# Patient Record
Sex: FEMALE | Race: WHITE | Employment: FULL TIME | URBAN - METROPOLITAN AREA
[De-identification: names, ages, dates, MRNs, and addresses within clinical notes are randomized per-mention and may not be internally consistent; named-entity substitution may affect disease eponyms.]

---

## 2020-01-29 PROBLEM — Z34.90 PREGNANCY: Status: ACTIVE | Noted: 2020-01-29

## 2020-02-04 PROBLEM — O34.219 HX SUCCESSFUL VBAC (VAGINAL BIRTH AFTER CESAREAN), CURRENTLY PREGNANT: Status: RESOLVED | Noted: 2020-02-04 | Resolved: 2020-02-04

## 2020-02-04 PROBLEM — Z34.90 TERM PREGNANCY: Status: ACTIVE | Noted: 2020-02-04

## 2020-02-04 PROBLEM — Z34.90 TERM PREGNANCY: Status: RESOLVED | Noted: 2020-02-04 | Resolved: 2020-02-04

## 2020-02-04 PROBLEM — O34.219 HX SUCCESSFUL VBAC (VAGINAL BIRTH AFTER CESAREAN), CURRENTLY PREGNANT: Status: ACTIVE | Noted: 2020-02-04

## 2020-02-04 PROBLEM — O34.219 VBAC, DELIVERED, CURRENT HOSPITALIZATION: Status: ACTIVE | Noted: 2020-02-04

## 2020-02-04 PROBLEM — Z34.90 PREGNANCY: Status: RESOLVED | Noted: 2020-01-29 | Resolved: 2020-02-04

## 2020-12-14 ENCOUNTER — VIRTUAL VISIT (OUTPATIENT)
Dept: FAMILY MEDICINE CLINIC | Age: 30
End: 2020-12-14
Payer: COMMERCIAL

## 2020-12-14 DIAGNOSIS — F41.9 ANXIETY: Primary | ICD-10-CM

## 2020-12-14 PROBLEM — O34.219 VBAC, DELIVERED, CURRENT HOSPITALIZATION: Status: RESOLVED | Noted: 2020-02-04 | Resolved: 2020-12-14

## 2020-12-14 PROCEDURE — 99203 OFFICE O/P NEW LOW 30 MIN: CPT | Performed by: INTERNAL MEDICINE

## 2020-12-14 RX ORDER — ESCITALOPRAM OXALATE 10 MG/1
10 TABLET ORAL DAILY
Qty: 30 TAB | Refills: 0 | Status: SHIPPED | OUTPATIENT
Start: 2020-12-14 | End: 2021-01-13 | Stop reason: ALTCHOICE

## 2020-12-14 RX ORDER — SERTRALINE HYDROCHLORIDE 50 MG/1
TABLET, FILM COATED ORAL
COMMUNITY
End: 2020-12-14

## 2020-12-14 RX ORDER — ACETAMINOPHEN AND CODEINE PHOSPHATE 120; 12 MG/5ML; MG/5ML
SOLUTION ORAL
COMMUNITY
Start: 2020-11-23 | End: 2021-01-13 | Stop reason: ALTCHOICE

## 2020-12-14 NOTE — PROGRESS NOTES
Ryan Barbosa is a 27 y.o. female who was seen by synchronous (real-time) audio-video technology using doxy. me on 2020. Location of the patient: Home    Location of the provider: [facility]    Consent:  She and/or health care decision maker is aware that that she may receive a bill for this telehealth service, depending on her insurance coverage, and has provided verbal consent to proceed: Yes    Subjective:   Ryan Barbosa is a 27 y.o. female who presents today for management of    Chief Complaint   Patient presents with   [facility].       Patient is here to establish care. Previous PCP: none in the past year. Anxiety  Patient complains of evaluation of anxiety disorder. She has the following anxiety symptoms: feeling overwhelmed, irritable, mood swings. Onset of symptoms was approximately 10 months ago, unchanged since that time. She denies current suicidal and homicidal ideation. Family history significant for nothing. Possible organic causes contributing are: none. Risk factors: started during postpartum period. Previous treatment includes Zoloft (whishe she feels is not effective) and no other therapies. She complains of the following side effects from the treatment: none. Past Medical History  Past Medical History:   Diagnosis Date    ADHD     Anxiety     COVID-19          Surgical History  Past Surgical History:   Procedure Laterality Date    HX  SECTION      HX WISDOM TEETH EXTRACTION          Current Medications  Current Outpatient Medications   Medication Sig    escitalopram oxalate (LEXAPRO) 10 mg tablet Take 1 Tab by mouth daily.  norethindrone (MICRONOR) 0.35 mg tab TK 1 T PO QD     No current facility-administered medications for this visit.         Allergies/Drug Reactions  Allergies   Allergen Reactions    Augmentin [Amoxicillin-Pot Clavulanate] Hives    Bactrim [Sulfamethoprim] Hives    Erythromycin Hives    Penicillins Hives Social History  Social History     Tobacco Use    Smoking status: Never Smoker    Smokeless tobacco: Never Used   Substance Use Topics    Alcohol use: Yes     Frequency: Monthly or less    Drug use: Never        Review of Systems  Review of Systems   Constitutional: Negative for chills, fever and weight loss. Respiratory: Negative. Cardiovascular: Negative for chest pain, palpitations and leg swelling. Gastrointestinal: Negative. Musculoskeletal: Negative. Neurological: Negative for dizziness and headaches. Psychiatric/Behavioral: Negative for depression, hallucinations, memory loss and substance abuse. The patient is nervous/anxious. Objective:     General: alert, cooperative, no distress   Mental  status: mental status: alert, oriented to person, place, and time, normal mood, behavior, speech, dress, motor activity, and thought processes   Resp: resp: normal effort and no respiratory distress   Neuro: neuro: no gross deficits   Skin: skin: no discoloration or lesions of concern on visible areas     Due to this being a TeleHealth evaluation, many elements of the physical examination are unable to be assessed. Assessment & Plan:   1. Anxiety  - relaxation techniques  - d/c sertraline - not effective  - switch to escitalopram oxalate (LEXAPRO) 10 mg tablet; Take 1 Tab by mouth daily. Dispense: 30 Tab; Refill: 0      Follow-up and Dispositions    · Return in about 1 month (around 1/14/2021) for follow-up doxy. We discussed the expected course, resolution and complications of the diagnosis(es) in detail. Medication risks, benefits, costs, interactions, and alternatives were discussed as indicated. I advised her to contact the office if her condition worsens, changes or fails to improve as anticipated. She expressed understanding with the diagnosis(es) and plan.          Pursuant to the emergency declaration under the [address], [address] waiver authority and the [employer] and [employer] Act, this Virtual  Visit was conducted, with patient's consent, to reduce the patient's risk of exposure to COVID-19 and provide continuity of care for an established patient. Services were provided through a video synchronous discussion virtually to substitute for in-person clinic visit.     Griselda Gill MD

## 2021-01-13 ENCOUNTER — VIRTUAL VISIT (OUTPATIENT)
Dept: FAMILY MEDICINE CLINIC | Age: 31
End: 2021-01-13
Payer: COMMERCIAL

## 2021-01-13 DIAGNOSIS — R45.86 MOOD SWINGS: Primary | ICD-10-CM

## 2021-01-13 DIAGNOSIS — F41.9 ANXIETY: ICD-10-CM

## 2021-01-13 PROCEDURE — 99213 OFFICE O/P EST LOW 20 MIN: CPT | Performed by: INTERNAL MEDICINE

## 2021-01-13 RX ORDER — QUETIAPINE FUMARATE 25 MG/1
25 TABLET, FILM COATED ORAL
Qty: 30 TAB | Refills: 0 | Status: SHIPPED | OUTPATIENT
Start: 2021-01-13 | End: 2021-02-12 | Stop reason: SDUPTHER

## 2021-01-13 RX ORDER — LEVONORGESTREL 52 MG/1
INTRAUTERINE DEVICE INTRAUTERINE
COMMUNITY

## 2021-01-13 NOTE — PROGRESS NOTES
Ion Ashley is a 27 y.o. female who was seen by synchronous (real-time) audio-video technology using doxy. me on 1/13/2021. Location of the patient: Home    [facility]    Consent:  She and/or health care decision maker is aware that that she may receive a bill for this telehealth service, depending on her insurance coverage, and has provided verbal consent to proceed: Yes    Subjective:   Ion Ashley is a 27 y.o. female who presents today for management of    Chief Complaint   Patient presents with    Anxiety       Anxiety Review:  Patient is seen for anxiety disorder. Current treatment includes Lexapro and no other therapies. Ongoing symptoms include: irritability   Patient denies: palpitations, chest pain, shortness of breath, paresthesias, racing thoughts, feelings of losing control, difficulty concentrating, suicidal ideation. Reported side effects from the treatment: none. Problem List  Patient Active Problem List    Diagnosis Date Noted    Anxiety        Current Medications  Current Outpatient Medications   Medication Sig    QUEtiapine (SEROquel) 25 mg tablet Take 1 Tab by mouth nightly.  levonorgestreL (Mirena) 20 mcg/24 hours (6 yrs) 52 mg IUD Mirena 20 mcg/24 hours (6 yrs) 52 mg intrauterine device   Take 1 device by intrauterine route as directed. No current facility-administered medications for this visit. Allergies/Drug Reactions  Allergies   Allergen Reactions    Augmentin [Amoxicillin-Pot Clavulanate] Hives    Bactrim [Sulfamethoprim] Hives    Erythromycin Hives    Penicillins Hives        Social History  Social History     Tobacco Use    Smoking status: Never Smoker    Smokeless tobacco: Never Used   Substance Use Topics    Alcohol use: Yes     Frequency: Monthly or less    Drug use: Never        Review of Systems  Review of Systems   Constitutional: Negative for chills, fever, malaise/fatigue and weight loss. Respiratory: Negative. Cardiovascular: Negative. Gastrointestinal: Negative. Neurological: Negative for dizziness and headaches. Psychiatric/Behavioral: Negative for depression. Objective:     General: alert, cooperative, no distress   Mental  status: mental status: alert, oriented to person, place, and time, normal mood, behavior, speech, dress, motor activity, and thought processes   Resp: resp: normal effort and no respiratory distress   Neuro: neuro: no gross deficits   Skin: skin: no discoloration or lesions of concern on visible areas     Due to this being a TeleHealth evaluation, many elements of the physical examination are unable to be assessed. Assessment & Plan:       ICD-10-CM ICD-9-CM    1. Mood swings  R45.86 799.24 QUEtiapine (SEROquel) 25 mg tablet      REFERRAL TO PSYCHIATRY   2. Anxiety  F41.9 300.00 QUEtiapine (SEROquel) 25 mg tablet      REFERRAL TO PSYCHIATRY     D/c Lexapro  Trial of Seroquel for mood stabilization      Follow-up and Dispositions    · Return in about 1 month (around 2/13/2021) for follow-up, telemedicine. We discussed the expected course, resolution and complications of the diagnosis(es) in detail. Medication risks, benefits, costs, interactions, and alternatives were discussed as indicated. I advised her to contact the office if her condition worsens, changes or fails to improve as anticipated. She expressed understanding with the diagnosis(es) and plan. Pursuant to the emergency declaration under the [facility] waiver authority and the [employer] Act, this Virtual  Visit was conducted, with patient's consent, to reduce the patient's risk of exposure to COVID-19 and provide continuity of care for an established patient.      Services were provided through a video synchronous discussion virtually to substitute for in-person clinic visit.     Chelsea Contreras MD

## 2021-02-12 ENCOUNTER — VIRTUAL VISIT (OUTPATIENT)
Dept: FAMILY MEDICINE CLINIC | Age: 31
End: 2021-02-12
Payer: COMMERCIAL

## 2021-02-12 DIAGNOSIS — R45.86 MOOD SWINGS: ICD-10-CM

## 2021-02-12 DIAGNOSIS — F41.9 ANXIETY: ICD-10-CM

## 2021-02-12 PROCEDURE — 99213 OFFICE O/P EST LOW 20 MIN: CPT | Performed by: INTERNAL MEDICINE

## 2021-02-12 RX ORDER — QUETIAPINE FUMARATE 25 MG/1
25 TABLET, FILM COATED ORAL
Qty: 30 TAB | Refills: 0 | Status: SHIPPED | OUTPATIENT
Start: 2021-02-12

## 2021-02-12 NOTE — PROGRESS NOTES
Raphael Heath is a 27 y.o. female who was seen by synchronous (real-time) audio-video technology using doxy. me on 2/12/2021. Location of the patient: Home    [facility] provider[facility]    Consent:  She and/or health care decision maker is aware that that she may receive a bill for this telehealth service, depending on her insurance coverage, and has provided verbal consent to proceed: Yes    Subjective:   Raphael Heath is a 27 y.o. female who presents today for management of    Chief Complaint   Patient presents with    Anxiety       Anxiety Review:  Patient is seen for anxiety disorder. Current treatment includes none. Patient stopped taking Seroquel few weeks ago due to drowsiness. She feels that the medication was helping with her anxiety and mood swings. It also helped her sleep better. Patient denies: depression  Reported side effects from the treatment: somnolence. Problem List  Patient Active Problem List    Diagnosis Date Noted    Anxiety        Current Medications  Current Outpatient Medications   Medication Sig    QUEtiapine (SEROquel) 25 mg tablet Take 1 Tab by mouth nightly.  levonorgestreL (Mirena) 20 mcg/24 hours (6 yrs) 52 mg IUD Mirena 20 mcg/24 hours (6 yrs) 52 mg intrauterine device   Take 1 device by intrauterine route as directed. No current facility-administered medications for this visit. Allergies/Drug Reactions  Allergies   Allergen Reactions    Augmentin [Amoxicillin-Pot Clavulanate] Hives    Bactrim [Sulfamethoprim] Hives    Erythromycin Hives    Penicillins Hives        Social History  Social History     Tobacco Use    Smoking status: Never Smoker    Smokeless tobacco: Never Used   Substance Use Topics    Alcohol use: Yes     Frequency: Monthly or less    Drug use: Never        Review of Systems  Review of Systems   Constitutional: Negative for chills, fever, malaise/fatigue and weight loss. Respiratory: Negative. Cardiovascular: Negative. Gastrointestinal: Negative. Musculoskeletal: Negative. Neurological: Negative. Psychiatric/Behavioral: Negative for depression, hallucinations, substance abuse and suicidal ideas. The patient is nervous/anxious. The patient does not have insomnia. Objective:     General: alert, cooperative, no distress   Mental  status: mental status: alert, oriented to person, place, and time, normal mood, behavior, speech, dress, motor activity, and thought processes   Resp: resp: normal effort and no respiratory distress   Neuro: neuro: no gross deficits   Skin: skin: no discoloration or lesions of concern on visible areas     Due to this being a TeleHealth evaluation, many elements of the physical examination are unable to be assessed. Assessment & Plan:       ICD-10-CM ICD-9-CM    1. Mood swings  R45.86 799.24 QUEtiapine (SEROquel) 25 mg tablet   2. Anxiety  F41.9 300.00 QUEtiapine (SEROquel) 25 mg tablet     Advised patient that somnolence is a common side effect of the medication. Advised to take earlier in the night. She can also try taking 0.5 tab for a few days then increase to one tablet. Follow-up and Dispositions    · Return in about 1 month (around 3/12/2021) for follow-up, doxy. We discussed the expected course, resolution and complications of the diagnosis(es) in detail. Medication risks, benefits, costs, interactions, and alternatives were discussed as indicated. I advised her to contact the office if her condition worsens, changes or fails to improve as anticipated. She expressed understanding with the diagnosis(es) and plan.          Pursuant to the emergency declaration under the [facility] waiver authority and the [address] and [employer] Act, this Virtual  Visit was conducted, with patient's consent, to reduce the patient's risk of exposure to COVID-19 and provide continuity of care for an established patient. Services were provided through a video synchronous discussion virtually to substitute for in-person clinic visit.     Vaibhav Govea MD

## 2021-08-16 ENCOUNTER — IMPORTED ENCOUNTER (OUTPATIENT)
Dept: URBAN - NONMETROPOLITAN AREA CLINIC 1 | Facility: CLINIC | Age: 31
End: 2021-08-16

## 2021-08-16 PROBLEM — H52.03: Noted: 2021-08-16

## 2021-08-16 PROCEDURE — 92004 COMPRE OPH EXAM NEW PT 1/>: CPT

## 2021-08-16 PROCEDURE — 92015 DETERMINE REFRACTIVE STATE: CPT

## 2022-04-10 ASSESSMENT — TONOMETRY
OD_IOP_MMHG: 18
OS_IOP_MMHG: 17

## 2022-04-10 ASSESSMENT — VISUAL ACUITY
OS_CC: J1
OS_CC: 20/25
OD_CC: 20/25
OD_CC: J1